# Patient Record
Sex: FEMALE | Race: WHITE | NOT HISPANIC OR LATINO | URBAN - METROPOLITAN AREA
[De-identification: names, ages, dates, MRNs, and addresses within clinical notes are randomized per-mention and may not be internally consistent; named-entity substitution may affect disease eponyms.]

---

## 2020-07-09 ENCOUNTER — OFFICE VISIT (OUTPATIENT)
Dept: URBAN - METROPOLITAN AREA CLINIC 68 | Facility: CLINIC | Age: 60
End: 2020-07-09

## 2022-06-04 ENCOUNTER — TELEPHONE ENCOUNTER (OUTPATIENT)
Dept: URBAN - METROPOLITAN AREA CLINIC 68 | Facility: CLINIC | Age: 62
End: 2022-06-04

## 2022-06-04 RX ORDER — POLYETHYLENE GLYCOL 3350, SODIUM SULFATE, SODIUM CHLORIDE, POTASSIUM CHLORIDE, ASCORBIC ACID, SODIUM ASCORBATE 140-9-5.2G
KIT ORAL DAILY
Qty: 1 | Refills: 0 | OUTPATIENT
Start: 2019-10-28 | End: 2019-10-29

## 2022-06-04 RX ORDER — DOXYCYCLINE 100 MG/1
DOXYCYCLINE HYCLATE( 100MG ORAL  TWO TIMES DAILY ) INACTIVE -HX ENTRY CAPSULE ORAL
OUTPATIENT
Start: 2019-10-28

## 2022-06-04 RX ORDER — ERGOCALCIFEROL 1.25 MG/1
VITAMIN D (ERGOCALCIFEROL)( 50000UNIT ORAL  WEEKLY ) INACTIVE -HX ENTRY CAPSULE ORAL WEEKLY
OUTPATIENT
Start: 2020-02-03

## 2022-06-04 RX ORDER — PANTOPRAZOLE SODIUM 40 MG/1
TABLET, DELAYED RELEASE ORAL DAILY
Qty: 90 | Refills: 0 | OUTPATIENT
Start: 2019-07-11 | End: 2019-10-09

## 2022-06-04 RX ORDER — PANCRELIPASE LIPASE, PANCRELIPASE PROTEASE, PANCRELIPASE AMYLASE 20000; 63000; 84000 [USP'U]/1; [USP'U]/1; [USP'U]/1
CAPSULE, DELAYED RELEASE ORAL
Qty: 24 | Refills: 0 | OUTPATIENT
Start: 2020-02-03 | End: 2020-02-13

## 2022-06-04 RX ORDER — ONDANSETRON HYDROCHLORIDE 4 MG/1
TABLET, FILM COATED ORAL
Qty: 90 | Refills: 0 | OUTPATIENT
Start: 2019-07-11 | End: 2019-08-10

## 2022-06-04 RX ORDER — OMEPRAZOLE 40 MG/1
OMEPRAZOLE( 40MG ORAL  DAILY ) INACTIVE -HX ENTRY CAPSULE, DELAYED RELEASE PELLETS ORAL DAILY
OUTPATIENT
Start: 2020-02-03

## 2022-06-04 RX ORDER — FERROUS SULFATE 137(45) MG
SLOW FE( 142 (45 FE)MG ORAL  DAILY ) INACTIVE -HX ENTRY TABLET, EXTENDED RELEASE ORAL DAILY
OUTPATIENT
Start: 2020-02-03

## 2022-06-04 RX ORDER — RIFAXIMIN 550 MG/1
TABLET ORAL
Qty: 42 | Refills: 0 | OUTPATIENT
Start: 2019-11-25 | End: 2019-12-09

## 2022-06-05 ENCOUNTER — TELEPHONE ENCOUNTER (OUTPATIENT)
Dept: URBAN - METROPOLITAN AREA CLINIC 68 | Facility: CLINIC | Age: 62
End: 2022-06-05

## 2022-06-05 RX ORDER — AMLODIPINE BESYLATE 5 MG/1
AMLODIPINE BESYLATE( 5MG ORAL  DAILY ) ACTIVE -HX ENTRY TABLET ORAL DAILY
Status: ACTIVE | COMMUNITY
Start: 2020-03-02

## 2022-06-05 RX ORDER — FLECAINIDE ACETATE 100 MG/1
FLECAINIDE ACETATE( 100MG ORAL  DAILY ) ACTIVE -HX ENTRY TABLET ORAL DAILY
Status: ACTIVE | COMMUNITY
Start: 2020-03-02

## 2022-06-05 RX ORDER — OMEPRAZOLE 20 MG/1
OMEPRAZOLE( 20MG ORAL  DAILY ) ACTIVE -HX ENTRY CAPSULE, DELAYED RELEASE ORAL DAILY
Status: ACTIVE | COMMUNITY
Start: 2020-03-02

## 2022-06-05 RX ORDER — SERTRALINE 50 MG/1
SERTRALINE HCL( 50MG ORAL  DAILY ) ACTIVE -HX ENTRY TABLET, FILM COATED ORAL DAILY
Status: ACTIVE | COMMUNITY
Start: 2020-03-02

## 2022-06-05 RX ORDER — TRAZODONE HYDROCHLORIDE 50 MG/1
TRAZODONE HCL( 50MG ORAL  DAILY ) ACTIVE -HX ENTRY TABLET ORAL DAILY
Status: ACTIVE | COMMUNITY
Start: 2020-03-02

## 2022-06-05 RX ORDER — SPIRONOLACTONE 100 MG/1
SPIRONOLACTONE( 100MG ORAL  DAILY ) ACTIVE -HX ENTRY TABLET, FILM COATED ORAL DAILY
Status: ACTIVE | COMMUNITY
Start: 2020-03-02

## 2022-06-05 RX ORDER — LOSARTAN POTASSIUM 100 MG/1
LOSARTAN POTASSIUM( 100MG ORAL  DAILY ) ACTIVE -HX ENTRY TABLET ORAL DAILY
Status: ACTIVE | COMMUNITY
Start: 2020-03-02

## 2022-06-05 RX ORDER — PANCRELIPASE LIPASE, PANCRELIPASE PROTEASE, PANCRELIPASE AMYLASE 25000; 79000; 105000 [USP'U]/1; [USP'U]/1; [USP'U]/1
CAPSULE, DELAYED RELEASE ORAL
Qty: 90 | Refills: 90 | Status: ACTIVE | COMMUNITY
Start: 2020-02-20

## 2022-06-25 ENCOUNTER — TELEPHONE ENCOUNTER (OUTPATIENT)
Age: 62
End: 2022-06-25

## 2022-06-25 RX ORDER — DOXYCYCLINE HYCLATE 100 MG/1
DOXYCYCLINE HYCLATE( 100MG ORAL  TWO TIMES DAILY ) INACTIVE -HX ENTRY CAPSULE ORAL
OUTPATIENT
Start: 2019-10-28

## 2022-06-25 RX ORDER — FERROUS SULFATE 137(45) MG
SLOW FE( 142 (45 FE)MG ORAL  DAILY ) INACTIVE -HX ENTRY TABLET, EXTENDED RELEASE ORAL DAILY
OUTPATIENT
Start: 2020-02-03

## 2022-06-25 RX ORDER — OMEPRAZOLE 40 MG/1
OMEPRAZOLE( 40MG ORAL  DAILY ) INACTIVE -HX ENTRY CAPSULE, DELAYED RELEASE ORAL DAILY
OUTPATIENT
Start: 2020-02-03

## 2022-06-25 RX ORDER — PANTOPRAZOLE 40 MG/1
TABLET, DELAYED RELEASE ORAL DAILY
Qty: 90 | Refills: 0 | OUTPATIENT
Start: 2019-07-11 | End: 2019-10-09

## 2022-06-25 RX ORDER — PANCRELIPASE LIPASE, PANCRELIPASE PROTEASE, PANCRELIPASE AMYLASE 20000; 63000; 84000 [USP'U]/1; [USP'U]/1; [USP'U]/1
CAPSULE, DELAYED RELEASE ORAL
Qty: 24 | Refills: 0 | OUTPATIENT
Start: 2020-02-03 | End: 2020-02-13

## 2022-06-25 RX ORDER — POLYETHYLENE GLYCOL 3350, SODIUM SULFATE, SODIUM CHLORIDE, POTASSIUM CHLORIDE, ASCORBIC ACID, SODIUM ASCORBATE 140-9-5.2G
KIT ORAL DAILY
Qty: 1 | Refills: 0 | OUTPATIENT
Start: 2019-10-28 | End: 2019-10-29

## 2022-06-25 RX ORDER — RIFAXIMIN 550 MG/1
TABLET ORAL
Qty: 42 | Refills: 0 | OUTPATIENT
Start: 2019-11-25 | End: 2019-12-09

## 2022-06-25 RX ORDER — ERGOCALCIFEROL 1.25 MG/1
VITAMIN D (ERGOCALCIFEROL)( 50000UNIT ORAL  WEEKLY ) INACTIVE -HX ENTRY CAPSULE ORAL WEEKLY
OUTPATIENT
Start: 2020-02-03

## 2022-06-26 ENCOUNTER — TELEPHONE ENCOUNTER (OUTPATIENT)
Age: 62
End: 2022-06-26

## 2022-06-26 RX ORDER — LOSARTAN POTASSIUM 100 MG/1
LOSARTAN POTASSIUM( 100MG ORAL  DAILY ) ACTIVE -HX ENTRY TABLET, FILM COATED ORAL DAILY
Status: ACTIVE | COMMUNITY
Start: 2020-03-02

## 2022-06-26 RX ORDER — OMEPRAZOLE 20 MG/1
OMEPRAZOLE( 20MG ORAL  DAILY ) ACTIVE -HX ENTRY CAPSULE, DELAYED RELEASE ORAL DAILY
Status: ACTIVE | COMMUNITY
Start: 2020-03-02

## 2022-06-26 RX ORDER — TRAZODONE HYDROCHLORIDE 50 MG/1
TRAZODONE HCL( 50MG ORAL  DAILY ) ACTIVE -HX ENTRY TABLET ORAL DAILY
Status: ACTIVE | COMMUNITY
Start: 2020-03-02

## 2022-06-26 RX ORDER — PANCRELIPASE LIPASE, PANCRELIPASE PROTEASE, PANCRELIPASE AMYLASE 25000; 79000; 105000 [USP'U]/1; [USP'U]/1; [USP'U]/1
CAPSULE, DELAYED RELEASE ORAL
Qty: 90 | Refills: 90 | Status: ACTIVE | COMMUNITY
Start: 2020-02-20

## 2022-06-26 RX ORDER — AMLODIPINE BESYLATE 5 MG/1
AMLODIPINE BESYLATE( 5MG ORAL  DAILY ) ACTIVE -HX ENTRY TABLET ORAL DAILY
Status: ACTIVE | COMMUNITY
Start: 2020-03-02

## 2022-06-26 RX ORDER — SPIRONOLACTONE 100 MG/1
SPIRONOLACTONE( 100MG ORAL  DAILY ) ACTIVE -HX ENTRY TABLET, COATED ORAL DAILY
Status: ACTIVE | COMMUNITY
Start: 2020-03-02

## 2022-06-26 RX ORDER — FLECAINIDE ACETATE 100 MG/1
FLECAINIDE ACETATE( 100MG ORAL  DAILY ) ACTIVE -HX ENTRY TABLET ORAL DAILY
Status: ACTIVE | COMMUNITY
Start: 2020-03-02

## 2022-06-26 RX ORDER — SERTRALINE 50 MG/1
SERTRALINE HCL( 50MG ORAL  DAILY ) ACTIVE -HX ENTRY TABLET, FILM COATED ORAL DAILY
Status: ACTIVE | COMMUNITY
Start: 2020-03-02

## 2022-06-26 RX ORDER — ACETAMINOPHEN ER 650 MG TABLET,EXTENDED RELEASE 650 MG
TYLENOL( 500MG ORAL  BID ) ACTIVE -HX ENTRY TABLET, EXTENDED RELEASE ORAL BID
Status: ACTIVE | COMMUNITY
Start: 2020-03-02

## 2022-11-13 ENCOUNTER — OFFICE VISIT (OUTPATIENT)
Dept: URBAN - METROPOLITAN AREA CLINIC 68 | Facility: CLINIC | Age: 62
End: 2022-11-13

## 2023-03-21 ENCOUNTER — OFFICE VISIT (OUTPATIENT)
Dept: URBAN - METROPOLITAN AREA CLINIC 66 | Facility: CLINIC | Age: 63
End: 2023-03-21

## 2023-04-11 ENCOUNTER — OFFICE VISIT (OUTPATIENT)
Dept: URBAN - METROPOLITAN AREA CLINIC 66 | Facility: CLINIC | Age: 63
End: 2023-04-11

## 2023-04-11 ENCOUNTER — DASHBOARD ENCOUNTERS (OUTPATIENT)
Age: 63
End: 2023-04-11

## 2023-04-11 RX ORDER — TRAZODONE HYDROCHLORIDE 50 MG/1
TRAZODONE HCL( 50MG ORAL  DAILY ) ACTIVE -HX ENTRY TABLET ORAL DAILY
Status: DISCONTINUED | COMMUNITY
Start: 2020-03-02

## 2023-04-11 RX ORDER — SERTRALINE 50 MG/1
SERTRALINE HCL( 50MG ORAL  DAILY ) ACTIVE -HX ENTRY TABLET, FILM COATED ORAL DAILY
Status: DISCONTINUED | COMMUNITY
Start: 2020-03-02

## 2023-04-11 RX ORDER — SPIRONOLACTONE 100 MG/1
SPIRONOLACTONE( 100MG ORAL  DAILY ) ACTIVE -HX ENTRY TABLET, COATED ORAL DAILY
Status: DISCONTINUED | COMMUNITY
Start: 2020-03-02

## 2023-04-11 RX ORDER — SOD SULF/POT CHLORIDE/MAG SULF 1.479 G
AS DIRECTED TABLET ORAL ONCE
Qty: 1 PACKET | Refills: 0 | OUTPATIENT
Start: 2023-04-11

## 2023-04-11 RX ORDER — AMLODIPINE BESYLATE 5 MG/1
AMLODIPINE BESYLATE( 5MG ORAL  DAILY ) ACTIVE -HX ENTRY TABLET ORAL DAILY
Status: ACTIVE | COMMUNITY
Start: 2020-03-02

## 2023-04-11 RX ORDER — PANCRELIPASE LIPASE, PANCRELIPASE PROTEASE, PANCRELIPASE AMYLASE 25000; 79000; 105000 [USP'U]/1; [USP'U]/1; [USP'U]/1
CAPSULE, DELAYED RELEASE ORAL
Qty: 90 | Refills: 90 | Status: DISCONTINUED | COMMUNITY
Start: 2020-02-20

## 2023-04-11 RX ORDER — FLECAINIDE ACETATE 100 MG/1
FLECAINIDE ACETATE( 100MG ORAL  DAILY ) ACTIVE -HX ENTRY TABLET ORAL DAILY
Status: DISCONTINUED | COMMUNITY
Start: 2020-03-02

## 2023-04-11 RX ORDER — OMEPRAZOLE 20 MG/1
OMEPRAZOLE( 20MG ORAL  DAILY ) ACTIVE -HX ENTRY CAPSULE, DELAYED RELEASE ORAL DAILY
Status: ACTIVE | COMMUNITY
Start: 2020-03-02

## 2023-04-11 RX ORDER — LOSARTAN POTASSIUM 100 MG/1
LOSARTAN POTASSIUM( 100MG ORAL  DAILY ) ACTIVE -HX ENTRY TABLET, FILM COATED ORAL DAILY
Status: DISCONTINUED | COMMUNITY
Start: 2020-03-02

## 2023-04-11 RX ORDER — ACETAMINOPHEN ER 650 MG TABLET,EXTENDED RELEASE 650 MG
TYLENOL( 500MG ORAL  BID ) ACTIVE -HX ENTRY TABLET, EXTENDED RELEASE ORAL BID
Status: DISCONTINUED | COMMUNITY
Start: 2020-03-02

## 2023-05-18 ENCOUNTER — OFFICE VISIT (OUTPATIENT)
Dept: URBAN - METROPOLITAN AREA SURGERY CENTER 12 | Facility: SURGERY CENTER | Age: 63
End: 2023-05-18

## 2024-10-15 ENCOUNTER — OFFICE VISIT (OUTPATIENT)
Dept: URBAN - METROPOLITAN AREA CLINIC 66 | Facility: CLINIC | Age: 64
End: 2024-10-15

## 2024-10-15 RX ORDER — OMEPRAZOLE 20 MG/1
OMEPRAZOLE( 20MG ORAL  DAILY ) ACTIVE -HX ENTRY CAPSULE, DELAYED RELEASE ORAL DAILY
COMMUNITY
Start: 2020-03-02

## 2024-10-15 RX ORDER — AMLODIPINE BESYLATE 5 MG/1
AMLODIPINE BESYLATE( 5MG ORAL  DAILY ) ACTIVE -HX ENTRY TABLET ORAL DAILY
COMMUNITY
Start: 2020-03-02

## 2024-11-12 ENCOUNTER — LAB OUTSIDE AN ENCOUNTER (OUTPATIENT)
Dept: URBAN - METROPOLITAN AREA CLINIC 66 | Facility: CLINIC | Age: 64
End: 2024-11-12

## 2024-11-12 ENCOUNTER — OFFICE VISIT (OUTPATIENT)
Dept: URBAN - METROPOLITAN AREA CLINIC 66 | Facility: CLINIC | Age: 64
End: 2024-11-12
Payer: MEDICARE

## 2024-11-12 VITALS
WEIGHT: 88.6 LBS | HEIGHT: 60 IN | DIASTOLIC BLOOD PRESSURE: 70 MMHG | SYSTOLIC BLOOD PRESSURE: 118 MMHG | BODY MASS INDEX: 17.4 KG/M2

## 2024-11-12 DIAGNOSIS — R63.4 WEIGHT LOSS: ICD-10-CM

## 2024-11-12 DIAGNOSIS — Z83.719 FH: COLON POLYPS: ICD-10-CM

## 2024-11-12 DIAGNOSIS — R19.4 CHANGE IN BOWEL HABITS: ICD-10-CM

## 2024-11-12 PROBLEM — 88111009: Status: ACTIVE | Noted: 2024-11-12

## 2024-11-12 PROBLEM — 89362005: Status: ACTIVE | Noted: 2024-11-12

## 2024-11-12 PROCEDURE — 99214 OFFICE O/P EST MOD 30 MIN: CPT | Performed by: INTERNAL MEDICINE

## 2024-11-12 RX ORDER — QUETIAPINE FUMARATE 100 MG/1
TAKE ONE TABLET BY MOUTH AT BEDTIME FOR 90 DAYS TABLET ORAL
Qty: 90 UNSPECIFIED | Refills: 0 | Status: ACTIVE | COMMUNITY

## 2024-11-12 RX ORDER — DIGOXIN 250 UG/1
1 TABLET TABLET ORAL ONCE A DAY
Status: ACTIVE | COMMUNITY

## 2024-11-12 RX ORDER — SOD SULF/POT CHLORIDE/MAG SULF 1.479 G
12 TABLETS TABLET ORAL
Qty: 24 | Refills: 0 | OUTPATIENT
Start: 2024-11-12 | End: 2024-11-13

## 2024-11-12 RX ORDER — ALENDRONATE SODIUM 70 MG/1
TAKE ONE TABLET BY MOUTH EVERY WEEK TABLET ORAL
Qty: 12 UNSPECIFIED | Refills: 1 | Status: ACTIVE | COMMUNITY

## 2024-11-12 RX ORDER — SOTALOL HYDROCHLORIDE 80 MG/1
TAKE ONE TABLET BY MOUTH TWICE A DAY TABLET ORAL
Qty: 180 UNSPECIFIED | Refills: 3 | Status: ACTIVE | COMMUNITY

## 2024-11-12 RX ORDER — SODIUM CHLORIDE TAB 1 GM 1 G
TAKE ONE TABLET BY MOUTH TWICE A DAY FOR 60 DAYS TAB MISCELLANEOUS
Qty: 120 UNSPECIFIED | Refills: 0 | Status: ACTIVE | COMMUNITY

## 2024-11-12 RX ORDER — SOD SULF/POT CHLORIDE/MAG SULF 1.479 G
AS DIRECTED TABLET ORAL ONCE
Qty: 1 PACKET | Refills: 0 | Status: ON HOLD | COMMUNITY
Start: 2023-04-11

## 2024-11-12 RX ORDER — AMLODIPINE BESYLATE 5 MG/1
AMLODIPINE BESYLATE( 5MG ORAL  DAILY ) ACTIVE -HX ENTRY TABLET ORAL DAILY
COMMUNITY
Start: 2020-03-02

## 2024-11-12 RX ORDER — RIVAROXABAN 20 MG/1
1 TABLET WITH FOOD TABLET, FILM COATED ORAL ONCE A DAY
Status: ACTIVE | COMMUNITY

## 2024-11-12 RX ORDER — OMEPRAZOLE 20 MG/1
OMEPRAZOLE( 20MG ORAL  DAILY ) ACTIVE -HX ENTRY CAPSULE, DELAYED RELEASE ORAL DAILY
COMMUNITY
Start: 2020-03-02

## 2024-11-12 RX ORDER — FLUDROCORTISONE ACETATE 0.1 MG/1
1 TABLET TABLET ORAL ONCE A DAY
Status: ACTIVE | COMMUNITY

## 2024-11-12 NOTE — PHYSICAL EXAM CONSTITUTIONAL:
well developed, well nourished , in no acute distress , ambulating without difficulty , normal communication ability Self

## 2024-11-12 NOTE — HPI-TODAY'S VISIT:
Patient evaluated for colonoscopy. Referred having changes in her bowel habbits.  Referred having intermittent episodes of constipation and diarrhea. Referred tried fiber supplements without improvement. Referrd having progressive weight loss.  Denies nausea, vomits, dysphagia, odynophagia, heartburn, abdominal pain,  GI bleeding

## 2024-11-25 ENCOUNTER — TELEPHONE ENCOUNTER (OUTPATIENT)
Dept: URBAN - METROPOLITAN AREA SURGERY CENTER 12 | Facility: SURGERY CENTER | Age: 64
End: 2024-11-25

## 2024-12-04 ENCOUNTER — OFFICE VISIT (OUTPATIENT)
Dept: URBAN - METROPOLITAN AREA SURGERY CENTER 12 | Facility: SURGERY CENTER | Age: 64
End: 2024-12-04

## 2024-12-19 ENCOUNTER — OFFICE VISIT (OUTPATIENT)
Dept: URBAN - METROPOLITAN AREA CLINIC 66 | Facility: CLINIC | Age: 64
End: 2024-12-19

## 2024-12-26 ENCOUNTER — OFFICE VISIT (OUTPATIENT)
Dept: URBAN - METROPOLITAN AREA CLINIC 66 | Facility: CLINIC | Age: 64
End: 2024-12-26

## 2025-07-10 ENCOUNTER — OFFICE VISIT (OUTPATIENT)
Dept: URBAN - METROPOLITAN AREA CLINIC 66 | Facility: CLINIC | Age: 65
End: 2025-07-10
Payer: COMMERCIAL

## 2025-07-10 ENCOUNTER — LAB OUTSIDE AN ENCOUNTER (OUTPATIENT)
Dept: URBAN - METROPOLITAN AREA CLINIC 66 | Facility: CLINIC | Age: 65
End: 2025-07-10

## 2025-07-10 DIAGNOSIS — R19.4 CHANGE IN BOWEL HABITS: ICD-10-CM

## 2025-07-10 DIAGNOSIS — K21.9 CHRONIC GERD: ICD-10-CM

## 2025-07-10 DIAGNOSIS — K59.09 ACUTE CONSTIPATION IN CHILDHOOD: ICD-10-CM

## 2025-07-10 PROBLEM — 14760008: Status: ACTIVE | Noted: 2025-07-10

## 2025-07-10 PROBLEM — 235595009: Status: ACTIVE | Noted: 2025-07-10

## 2025-07-10 PROCEDURE — 99214 OFFICE O/P EST MOD 30 MIN: CPT

## 2025-07-10 RX ORDER — ALENDRONATE SODIUM 70 MG/1
TAKE ONE TABLET BY MOUTH EVERY WEEK TABLET ORAL
Qty: 12 UNSPECIFIED | Refills: 1 | Status: ACTIVE | COMMUNITY

## 2025-07-10 RX ORDER — FLUDROCORTISONE ACETATE 0.1 MG/1
1 TABLET TABLET ORAL ONCE A DAY
COMMUNITY

## 2025-07-10 RX ORDER — RIVAROXABAN 20 MG/1
1 TABLET WITH FOOD TABLET, FILM COATED ORAL ONCE A DAY
Status: ACTIVE | COMMUNITY

## 2025-07-10 RX ORDER — DIGOXIN 250 UG/1
1 TABLET TABLET ORAL ONCE A DAY
Status: ACTIVE | COMMUNITY

## 2025-07-10 RX ORDER — SOD SULF/POT CHLORIDE/MAG SULF 1.479 G
12 TABLETS THE FIRST DOSE THE EVENING BEFORE AND SECOND DOSE THE MORNING OF COLONOSCOPY TABLET ORAL TWICE A DAY
Qty: 24 | OUTPATIENT
Start: 2025-07-10 | End: 2025-07-11

## 2025-07-10 RX ORDER — SOTALOL HYDROCHLORIDE 80 MG/1
TAKE ONE TABLET BY MOUTH TWICE A DAY TABLET ORAL
Qty: 180 UNSPECIFIED | Refills: 3 | Status: ACTIVE | COMMUNITY

## 2025-07-10 RX ORDER — QUETIAPINE FUMARATE 100 MG/1
TAKE ONE TABLET BY MOUTH AT BEDTIME FOR 90 DAYS TABLET ORAL
Qty: 90 UNSPECIFIED | Refills: 0 | Status: ACTIVE | COMMUNITY

## 2025-07-10 NOTE — HPI-TODAY'S VISIT:
Patient evaluated for change in bowel habits and chronic GERD.  Refers constipation. Refers taking Duloclax 3 days a week with some relief. Refers GERD is well-controlled with Omeprazole 40 mg/daily.  Denies nausea/vomiting, dysphagia, odynophagia, abdominal pain, melena, rectal bleeding, weight loss, fever.

## 2025-07-15 ENCOUNTER — TELEPHONE ENCOUNTER (OUTPATIENT)
Dept: URBAN - METROPOLITAN AREA CLINIC 68 | Facility: CLINIC | Age: 65
End: 2025-07-15

## 2025-07-15 LAB
A/G RATIO: 1.6
ABSOLUTE BASOPHILS: 29
ABSOLUTE EOSINOPHILS: 189
ABSOLUTE LYMPHOCYTES: 1050
ABSOLUTE MONOCYTES: 361
ABSOLUTE NEUTROPHILS: 2472
ALBUMIN: 4.3
ALKALINE PHOSPHATASE: 70
ALT (SGPT): 21
AST (SGOT): 30
BASOPHILS: 0.7
BILIRUBIN, TOTAL: 0.8
BUN/CREATININE RATIO: 27
BUN: 28
CALCIUM: 9.4
CARBON DIOXIDE, TOTAL: 31
CHLORIDE: 88
CREATININE: 1.04
EGFR: 59
EOSINOPHILS: 4.6
GLOBULIN, TOTAL: 2.7
GLUCOSE: 93
HEMATOCRIT: 36.8
HEMOGLOBIN: 12.3
LYMPHOCYTES: 25.6
MCH: 30.5
MCHC: 33.4
MCV: 91.3
MONOCYTES: 8.8
MPV: 9.8
NEUTROPHILS: 60.3
PLATELET COUNT: 299
POTASSIUM: 3.8
PROTEIN, TOTAL: 7
RDW: 13
RED BLOOD CELL COUNT: 4.03
SODIUM: 130
TSH W/REFLEX TO FT4: 1.46
WHITE BLOOD CELL COUNT: 4.1

## 2025-07-15 RX ORDER — POLYETHYLENE GLYCOL 3350, SODIUM SULFATE ANHYDROUS, SODIUM BICARBONATE, SODIUM CHLORIDE, POTASSIUM CHLORIDE 236; 22.74; 6.74; 5.86; 2.97 G/4L; G/4L; G/4L; G/4L; G/4L
4000 ML POWDER, FOR SOLUTION ORAL ONCE
Qty: 4000 MILLILITER | OUTPATIENT
Start: 2025-07-15 | End: 2025-07-16

## 2025-07-21 ENCOUNTER — CLAIMS CREATED FROM THE CLAIM WINDOW (OUTPATIENT)
Dept: URBAN - METROPOLITAN AREA SURGERY CENTER 12 | Facility: SURGERY CENTER | Age: 65
End: 2025-07-21

## 2025-07-21 ENCOUNTER — CLAIMS CREATED FROM THE CLAIM WINDOW (OUTPATIENT)
Dept: URBAN - METROPOLITAN AREA SURGERY CENTER 12 | Facility: SURGERY CENTER | Age: 65
End: 2025-07-21
Payer: COMMERCIAL

## 2025-07-21 ENCOUNTER — CLAIMS CREATED FROM THE CLAIM WINDOW (OUTPATIENT)
Dept: URBAN - METROPOLITAN AREA CLINIC 4 | Facility: CLINIC | Age: 65
End: 2025-07-21
Payer: COMMERCIAL

## 2025-07-21 DIAGNOSIS — R19.4 CHANGE IN BOWEL HABIT: ICD-10-CM

## 2025-07-21 DIAGNOSIS — K21.9 GASTRO-ESOPHAGEAL REFLUX DISEASE WITHOUT ESOPHAGITIS: ICD-10-CM

## 2025-07-21 DIAGNOSIS — K64.8 OTHER HEMORRHOIDS: ICD-10-CM

## 2025-07-21 DIAGNOSIS — K63.5 COLON POLYP: ICD-10-CM

## 2025-07-21 DIAGNOSIS — K57.30 DIVERTICULOSIS OF LARGE INTESTINE WITHOUT PERFORATION OR ABSCESS WITHOUT BLEEDING: ICD-10-CM

## 2025-07-21 DIAGNOSIS — K22.89 OTHER SPECIFIED DISEASE OF ESOPHAGUS: ICD-10-CM

## 2025-07-21 DIAGNOSIS — K31.89 OTHER DISEASES OF STOMACH AND DUODENUM: ICD-10-CM

## 2025-07-21 DIAGNOSIS — Z98.84 BARIATRIC SURGERY STATUS: ICD-10-CM

## 2025-07-21 DIAGNOSIS — K29.70 GASTRITIS WITHOUT BLEEDING, UNSPECIFIED CHRONICITY, UNSPECIFIED GASTRITIS TYPE: ICD-10-CM

## 2025-07-21 DIAGNOSIS — K63.89 OTHER SPECIFIED DISEASES OF INTESTINE: ICD-10-CM

## 2025-07-21 PROCEDURE — 88305 TISSUE EXAM BY PATHOLOGIST: CPT | Performed by: PATHOLOGY

## 2025-07-21 PROCEDURE — 45380 COLONOSCOPY AND BIOPSY: CPT | Performed by: INTERNAL MEDICINE

## 2025-07-21 PROCEDURE — 43239 EGD BIOPSY SINGLE/MULTIPLE: CPT | Performed by: INTERNAL MEDICINE

## 2025-07-21 PROCEDURE — 45385 COLONOSCOPY W/LESION REMOVAL: CPT | Performed by: INTERNAL MEDICINE

## 2025-07-21 RX ORDER — QUETIAPINE FUMARATE 100 MG/1
TAKE ONE TABLET BY MOUTH AT BEDTIME FOR 90 DAYS TABLET ORAL
Qty: 90 UNSPECIFIED | Refills: 0 | Status: ACTIVE | COMMUNITY

## 2025-07-21 RX ORDER — ALENDRONATE SODIUM 70 MG/1
TAKE ONE TABLET BY MOUTH EVERY WEEK TABLET ORAL
Qty: 12 UNSPECIFIED | Refills: 1 | Status: ACTIVE | COMMUNITY

## 2025-07-21 RX ORDER — FLUDROCORTISONE ACETATE 0.1 MG/1
1 TABLET TABLET ORAL ONCE A DAY
COMMUNITY

## 2025-07-21 RX ORDER — RIVAROXABAN 20 MG/1
1 TABLET WITH FOOD TABLET, FILM COATED ORAL ONCE A DAY
Status: ACTIVE | COMMUNITY

## 2025-07-21 RX ORDER — SOTALOL HYDROCHLORIDE 80 MG/1
TAKE ONE TABLET BY MOUTH TWICE A DAY TABLET ORAL
Qty: 180 UNSPECIFIED | Refills: 3 | Status: ACTIVE | COMMUNITY

## 2025-07-21 RX ORDER — DIGOXIN 250 UG/1
1 TABLET TABLET ORAL ONCE A DAY
Status: ACTIVE | COMMUNITY

## 2025-08-07 ENCOUNTER — OFFICE VISIT (OUTPATIENT)
Dept: URBAN - METROPOLITAN AREA CLINIC 66 | Facility: CLINIC | Age: 65
End: 2025-08-07
Payer: COMMERCIAL

## 2025-08-07 DIAGNOSIS — Z86.0100 HISTORY OF COLON POLYPS: ICD-10-CM

## 2025-08-07 DIAGNOSIS — K64.9 HEMORRHOIDS, UNSPECIFIED HEMORRHOID TYPE: ICD-10-CM

## 2025-08-07 DIAGNOSIS — K59.09 ACUTE CONSTIPATION IN CHILDHOOD: ICD-10-CM

## 2025-08-07 DIAGNOSIS — K21.9 CHRONIC GERD: ICD-10-CM

## 2025-08-07 DIAGNOSIS — K57.90 DIVERTICULOSIS: ICD-10-CM

## 2025-08-07 PROBLEM — 397881000: Status: ACTIVE | Noted: 2025-08-07

## 2025-08-07 PROBLEM — 428283002: Status: ACTIVE | Noted: 2025-08-07

## 2025-08-07 PROBLEM — 70153002: Status: ACTIVE | Noted: 2025-08-07

## 2025-08-07 PROCEDURE — 99213 OFFICE O/P EST LOW 20 MIN: CPT

## 2025-08-07 RX ORDER — SOTALOL HYDROCHLORIDE 80 MG/1
TAKE ONE TABLET BY MOUTH TWICE A DAY TABLET ORAL
Qty: 180 UNSPECIFIED | Refills: 3 | COMMUNITY

## 2025-08-07 RX ORDER — FLUDROCORTISONE ACETATE 0.1 MG/1
1 TABLET TABLET ORAL ONCE A DAY
COMMUNITY

## 2025-08-07 RX ORDER — QUETIAPINE FUMARATE 100 MG/1
TAKE ONE TABLET BY MOUTH AT BEDTIME FOR 90 DAYS TABLET ORAL
Qty: 90 UNSPECIFIED | Refills: 0 | COMMUNITY

## 2025-08-07 RX ORDER — RIVAROXABAN 20 MG/1
1 TABLET WITH FOOD TABLET, FILM COATED ORAL ONCE A DAY
COMMUNITY

## 2025-08-07 RX ORDER — DIGOXIN 250 UG/1
1 TABLET TABLET ORAL ONCE A DAY
COMMUNITY

## 2025-08-07 RX ORDER — ALENDRONATE SODIUM 70 MG/1
TAKE ONE TABLET BY MOUTH EVERY WEEK TABLET ORAL
Qty: 12 UNSPECIFIED | Refills: 1 | COMMUNITY